# Patient Record
Sex: FEMALE | Race: WHITE | NOT HISPANIC OR LATINO | Employment: FULL TIME | URBAN - METROPOLITAN AREA
[De-identification: names, ages, dates, MRNs, and addresses within clinical notes are randomized per-mention and may not be internally consistent; named-entity substitution may affect disease eponyms.]

---

## 2018-03-12 ENCOUNTER — OFFICE VISIT - HEALTHEAST (OUTPATIENT)
Dept: FAMILY MEDICINE | Facility: CLINIC | Age: 38
End: 2018-03-12

## 2018-03-12 ENCOUNTER — COMMUNICATION - HEALTHEAST (OUTPATIENT)
Dept: TELEHEALTH | Facility: CLINIC | Age: 38
End: 2018-03-12

## 2018-03-12 DIAGNOSIS — Z71.84 COUNSELING ABOUT TRAVEL: ICD-10-CM

## 2018-03-12 DIAGNOSIS — Z13.220 SCREENING FOR LIPID DISORDERS: ICD-10-CM

## 2018-03-12 DIAGNOSIS — Z12.4 SCREENING FOR MALIGNANT NEOPLASM OF CERVIX: ICD-10-CM

## 2018-03-12 DIAGNOSIS — Z13.1 SCREENING FOR DIABETES MELLITUS: ICD-10-CM

## 2018-03-12 DIAGNOSIS — Z11.3 SCREEN FOR STD (SEXUALLY TRANSMITTED DISEASE): ICD-10-CM

## 2018-03-12 DIAGNOSIS — L98.9 SKIN LESIONS, GENERALIZED: ICD-10-CM

## 2018-03-12 DIAGNOSIS — Z00.00 ROUTINE HEALTH MAINTENANCE: ICD-10-CM

## 2018-03-12 LAB
ANION GAP SERPL CALCULATED.3IONS-SCNC: 13 MMOL/L (ref 5–18)
BUN SERPL-MCNC: 14 MG/DL (ref 8–22)
CALCIUM SERPL-MCNC: 9.9 MG/DL (ref 8.5–10.5)
CHLORIDE BLD-SCNC: 102 MMOL/L (ref 98–107)
CHOLEST SERPL-MCNC: 199 MG/DL
CO2 SERPL-SCNC: 25 MMOL/L (ref 22–31)
CREAT SERPL-MCNC: 0.78 MG/DL (ref 0.6–1.1)
FASTING STATUS PATIENT QL REPORTED: YES
GFR SERPL CREATININE-BSD FRML MDRD: >60 ML/MIN/1.73M2
GLUCOSE BLD-MCNC: 80 MG/DL (ref 70–125)
HDLC SERPL-MCNC: 57 MG/DL
LDLC SERPL CALC-MCNC: 127 MG/DL
POTASSIUM BLD-SCNC: 4.2 MMOL/L (ref 3.5–5)
SODIUM SERPL-SCNC: 140 MMOL/L (ref 136–145)
TRIGL SERPL-MCNC: 76 MG/DL

## 2018-03-12 ASSESSMENT — MIFFLIN-ST. JEOR: SCORE: 1376.58

## 2018-03-13 LAB
C TRACH DNA SPEC QL PROBE+SIG AMP: NEGATIVE
HPV SOURCE: NORMAL
HUMAN PAPILLOMA VIRUS 16 DNA: NEGATIVE
HUMAN PAPILLOMA VIRUS 18 DNA: NEGATIVE
HUMAN PAPILLOMA VIRUS FINAL DIAGNOSIS: NORMAL
HUMAN PAPILLOMA VIRUS OTHER HR: NEGATIVE
N GONORRHOEA DNA SPEC QL NAA+PROBE: NEGATIVE
SPECIMEN DESCRIPTION: NORMAL

## 2018-04-19 ENCOUNTER — RECORDS - HEALTHEAST (OUTPATIENT)
Dept: ADMINISTRATIVE | Facility: OTHER | Age: 38
End: 2018-04-19

## 2021-05-31 VITALS — WEIGHT: 149 LBS | BODY MASS INDEX: 23.95 KG/M2 | HEIGHT: 66 IN

## 2021-06-16 NOTE — PROGRESS NOTES
Assessment/Plan:     1. Routine health maintenance    2. Screening for diabetes mellitus  - Basic Metabolic Panel    3. Screening for lipid disorders  - Lipid [facility] FASTING    4. Screening for malignant neoplasm of cervix  - Gynecologic Cytology (PAP Smear)    5. Screen for STD (sexually transmitted disease)  - Chlamydia trachomatis & Neisseria gonorrhoeae, Amplified Detection    6. Counseling about travel  Yellow fever not offered in our clinic.  Referral to travel clinic.  - Ambulatory referral to Travel Clinic    7. Skin lesions, generalized  - Ambulatory referral to Dermatology    Subjective:   Lou Castellanos  is a 37 y.o. female who presents for an annual exam.  Patient has not had routine medical care in many years.  She [place], and traveled frequently with her family.  She has lived in [place] for the past 8 years, and works as a  at [employer].  Patient has never had a Pap exam.  Pertinent family history for skin cancer, and would like a formal check.  Patient is wearing sunscreen.      Patient will be traveling to [facility] in May.  She is requesting a yellow fever vaccination today.      Healthy Habits:     Regular Exercise: Tries  Sunscreen Use: yes  Healthy Diet: yes  Dental Visits Regularly: every 6 months  Seat Belt: 100%  Sexually active: no  Self Breast Exam Monthly: none  Hemoccults: no  Flex Sig: no  Colonoscopy: no  Lipid Profile: yes, been at least 7-8 years  Glucose Screen: been 7-8 years  Prevention of Osteoporosis: none  Last Dexa: none    Immunization History   Administered Date(s) Administered     DTaP, historic 1982, 02/01/1983, 04/01/1983, 03/17/1984, 07/21/1988     Hep A, historic 10/01/2013     Hep B, historic 05/18/1997, 07/07/1998, 07/29/1998     HiB, historic,unspecified 05/06/1986     IPV 1982, 02/01/1983, 04/01/1983, 03/17/1984, 07/21/1988     MMR 02/24/1984, 05/18/1995     Td,adult,historic,unspecified 10/01/2002     Tdap 10/10/1993,  "10/01/2013       Immunization status: up to date and documented.    Gynecologic History  LMP: 03/09/2018  Contraception: none  Last Pap: NEVER Results were: N/A  Last mammogram: none Results were: na    OB History     No data available          No current outpatient prescriptions on file.     No current facility-administered medications for this visit.        No past medical history on file.  Past Surgical History:   Procedure Laterality Date     WISDOM TOOTH EXTRACTION        No Known Allergies  No family history on file.  Social History     Social History     Marital status: Single     Spouse name: N/A     Number of children: N/A     Years of education: N/A     Occupational History      3m     [employer]ificial [employer]     Social History Main Topics     Smoking status: Never Smoker     Smokeless tobacco: Never Used     Alcohol use 1.8 oz/week     3 Glasses of wine per week     Drug use: No     Sexual activity: No     Other Topics Concern     Not on file     Social History Narrative     No narrative on file        Review of Systems  Fourteen point review of systems negative except as mentioned in HPI    Objective:      Vitals:    03/12/18 0834   BP: 100/62   Patient Site: Left Arm   Patient Position: Sitting   Cuff Size: Adult Regular   Pulse: 72   Temp: 98.5  F (36.9  C)   TempSrc: Oral   Weight: 149 lb (67.6 kg)   Height: 5' 6.25\" (1.683 m)     Body mass index is 23.87 kg/(m^2).    Physical Exam:  General Appearance: Alert, cooperative, no distress, appears stated age  Head: Normocephalic, without obvious abnormality, atraumatic  Eyes: PERRL, conjunctiva/corneas clear, EOM's intact. Wearing glasses.  Ears: Normal TM's and external ear canals, both ears  Nose: Nares normal, septum midline,mucosa normal, no drainage  Throat: Lips, mucosa, and tongue normal; teeth and gums normal  Neck: Supple, symmetrical, trachea midline, no adenopathy;  thyroid: not enlarged, symmetric, no tenderness/mass/nodules; no " carotid bruit or JVD  Back: Symmetric, no curvature, ROM normal, no CVA tenderness  Lungs: Clear to auscultation bilaterally, respirations unlabored  Breasts: No breast masses, tenderness, asymmetry, or nipple discharge.  Heart: Regular rate and rhythm, S1 and S2 normal, no murmur, rub, or gallop  Abdomen: Soft, non-tender, bowel sounds active all four quadrants,  no masses, no organomegaly  Pelvic:Normally developed genitalia with no external lesions or eruptions. Vagina and cervix show no lesions, inflammation, discharge or tenderness. No cystocele, No rectocele. No adnexal mass or tenderness.  Extremities: Extremities normal, atraumatic, no cyanosis or edema  Skin: Multiple normal-appearing freckles and skin lesions.   Lymph nodes: Cervical, supraclavicular, and axillary nodes normal  Neurologic: Normal   Psychologic: appropriate affective, answers all of my questions appropriately. No hallucinations, delusion, or suicidal ideations.    KETTY StewartC

## 2021-06-27 ENCOUNTER — HEALTH MAINTENANCE LETTER (OUTPATIENT)
Age: 41
End: 2021-06-27

## 2021-10-17 ENCOUNTER — HEALTH MAINTENANCE LETTER (OUTPATIENT)
Age: 41
End: 2021-10-17

## 2022-07-23 ENCOUNTER — HEALTH MAINTENANCE LETTER (OUTPATIENT)
Age: 42
End: 2022-07-23

## 2022-10-01 ENCOUNTER — HEALTH MAINTENANCE LETTER (OUTPATIENT)
Age: 42
End: 2022-10-01

## 2023-04-14 ENCOUNTER — APPOINTMENT (OUTPATIENT)
Dept: ULTRASOUND IMAGING | Facility: CLINIC | Age: 43
End: 2023-04-14
Attending: EMERGENCY MEDICINE
Payer: COMMERCIAL

## 2023-04-14 ENCOUNTER — APPOINTMENT (OUTPATIENT)
Dept: CT IMAGING | Facility: CLINIC | Age: 43
End: 2023-04-14
Attending: EMERGENCY MEDICINE
Payer: COMMERCIAL

## 2023-04-14 ENCOUNTER — HOSPITAL ENCOUNTER (EMERGENCY)
Facility: CLINIC | Age: 43
Discharge: HOME | End: 2023-04-14
Attending: EMERGENCY MEDICINE | Admitting: EMERGENCY MEDICINE
Payer: COMMERCIAL

## 2023-04-14 VITALS
TEMPERATURE: 98.1 F | OXYGEN SATURATION: 99 % | HEART RATE: 94 BPM | HEIGHT: 66 IN | SYSTOLIC BLOOD PRESSURE: 155 MMHG | RESPIRATION RATE: 16 BRPM | WEIGHT: 140 LBS | DIASTOLIC BLOOD PRESSURE: 85 MMHG | BODY MASS INDEX: 22.5 KG/M2

## 2023-04-14 DIAGNOSIS — R10.32 ABDOMINAL PAIN, LEFT LOWER QUADRANT: ICD-10-CM

## 2023-04-14 DIAGNOSIS — R11.0 NAUSEA: ICD-10-CM

## 2023-04-14 LAB
ALBUMIN SERPL-MCNC: 4.2 G/DL (ref 3.5–5)
ALBUMIN UR-MCNC: NEGATIVE MG/DL
ALP SERPL-CCNC: 39 U/L (ref 45–120)
ALT SERPL W P-5'-P-CCNC: 11 U/L (ref 0–45)
ANION GAP SERPL CALCULATED.3IONS-SCNC: 12 MMOL/L (ref 5–18)
APPEARANCE UR: CLEAR
AST SERPL W P-5'-P-CCNC: 19 U/L (ref 0–40)
BACTERIA #/AREA URNS HPF: ABNORMAL /HPF
BASOPHILS # BLD AUTO: 0.1 10E3/UL (ref 0–0.2)
BASOPHILS NFR BLD AUTO: 1 %
BILIRUB SERPL-MCNC: 0.7 MG/DL (ref 0–1)
BILIRUB UR QL STRIP: NEGATIVE
BUN SERPL-MCNC: 10 MG/DL (ref 8–22)
CALCIUM SERPL-MCNC: 9.3 MG/DL (ref 8.5–10.5)
CHLORIDE BLD-SCNC: 102 MMOL/L (ref 98–107)
CO2 SERPL-SCNC: 23 MMOL/L (ref 22–31)
COLOR UR AUTO: COLORLESS
CREAT SERPL-MCNC: 0.84 MG/DL (ref 0.6–1.1)
EOSINOPHIL # BLD AUTO: 0 10E3/UL (ref 0–0.7)
EOSINOPHIL NFR BLD AUTO: 0 %
ERYTHROCYTE [DISTWIDTH] IN BLOOD BY AUTOMATED COUNT: 13.3 % (ref 10–15)
GFR SERPL CREATININE-BSD FRML MDRD: 90 ML/MIN/1.73M2
GLUCOSE BLD-MCNC: 107 MG/DL (ref 70–125)
GLUCOSE UR STRIP-MCNC: NEGATIVE MG/DL
HCG UR QL: NEGATIVE
HCT VFR BLD AUTO: 38.9 % (ref 35–47)
HGB BLD-MCNC: 12.5 G/DL (ref 11.7–15.7)
HGB UR QL STRIP: NEGATIVE
IMM GRANULOCYTES # BLD: 0 10E3/UL
IMM GRANULOCYTES NFR BLD: 0 %
KETONES UR STRIP-MCNC: NEGATIVE MG/DL
LEUKOCYTE ESTERASE UR QL STRIP: NEGATIVE
LIPASE SERPL-CCNC: 22 U/L (ref 0–52)
LYMPHOCYTES # BLD AUTO: 1.3 10E3/UL (ref 0.8–5.3)
LYMPHOCYTES NFR BLD AUTO: 12 %
MAGNESIUM SERPL-MCNC: 1.8 MG/DL (ref 1.8–2.6)
MCH RBC QN AUTO: 28.5 PG (ref 26.5–33)
MCHC RBC AUTO-ENTMCNC: 32.1 G/DL (ref 31.5–36.5)
MCV RBC AUTO: 89 FL (ref 78–100)
MONOCYTES # BLD AUTO: 0.6 10E3/UL (ref 0–1.3)
MONOCYTES NFR BLD AUTO: 6 %
NEUTROPHILS # BLD AUTO: 8.9 10E3/UL (ref 1.6–8.3)
NEUTROPHILS NFR BLD AUTO: 81 %
NITRATE UR QL: NEGATIVE
NRBC # BLD AUTO: 0 10E3/UL
NRBC BLD AUTO-RTO: 0 /100
PH UR STRIP: 5.5 [PH] (ref 5–7)
PLATELET # BLD AUTO: 320 10E3/UL (ref 150–450)
POTASSIUM BLD-SCNC: 3.9 MMOL/L (ref 3.5–5)
PROT SERPL-MCNC: 7.8 G/DL (ref 6–8)
RBC # BLD AUTO: 4.38 10E6/UL (ref 3.8–5.2)
RBC URINE: <1 /HPF
SODIUM SERPL-SCNC: 137 MMOL/L (ref 136–145)
SP GR UR STRIP: 1 (ref 1–1.03)
SQUAMOUS EPITHELIAL: 1 /HPF
UROBILINOGEN UR STRIP-MCNC: <2 MG/DL
WBC # BLD AUTO: 11 10E3/UL (ref 4–11)
WBC URINE: <1 /HPF

## 2023-04-14 PROCEDURE — 96375 TX/PRO/DX INJ NEW DRUG ADDON: CPT

## 2023-04-14 PROCEDURE — 80053 COMPREHEN METABOLIC PANEL: CPT | Performed by: EMERGENCY MEDICINE

## 2023-04-14 PROCEDURE — 81025 URINE PREGNANCY TEST: CPT | Performed by: EMERGENCY MEDICINE

## 2023-04-14 PROCEDURE — 36415 COLL VENOUS BLD VENIPUNCTURE: CPT | Performed by: EMERGENCY MEDICINE

## 2023-04-14 PROCEDURE — 83690 ASSAY OF LIPASE: CPT | Performed by: EMERGENCY MEDICINE

## 2023-04-14 PROCEDURE — 81003 URINALYSIS AUTO W/O SCOPE: CPT | Performed by: EMERGENCY MEDICINE

## 2023-04-14 PROCEDURE — 83735 ASSAY OF MAGNESIUM: CPT | Performed by: EMERGENCY MEDICINE

## 2023-04-14 PROCEDURE — 96374 THER/PROPH/DIAG INJ IV PUSH: CPT | Mod: 59

## 2023-04-14 PROCEDURE — 250N000011 HC RX IP 250 OP 636: Performed by: EMERGENCY MEDICINE

## 2023-04-14 PROCEDURE — 99285 EMERGENCY DEPT VISIT HI MDM: CPT | Mod: 25

## 2023-04-14 PROCEDURE — 74177 CT ABD & PELVIS W/CONTRAST: CPT

## 2023-04-14 PROCEDURE — 76856 US EXAM PELVIC COMPLETE: CPT

## 2023-04-14 PROCEDURE — 85025 COMPLETE CBC W/AUTO DIFF WBC: CPT | Performed by: EMERGENCY MEDICINE

## 2023-04-14 RX ORDER — ONDANSETRON 2 MG/ML
4 INJECTION INTRAMUSCULAR; INTRAVENOUS ONCE
Status: COMPLETED | OUTPATIENT
Start: 2023-04-14 | End: 2023-04-14

## 2023-04-14 RX ORDER — IOPAMIDOL 755 MG/ML
100 INJECTION, SOLUTION INTRAVASCULAR ONCE
Status: COMPLETED | OUTPATIENT
Start: 2023-04-14 | End: 2023-04-14

## 2023-04-14 RX ORDER — ONDANSETRON 4 MG/1
4 TABLET, ORALLY DISINTEGRATING ORAL EVERY 8 HOURS PRN
Qty: 10 TABLET | Refills: 0 | Status: SHIPPED | OUTPATIENT
Start: 2023-04-14 | End: 2023-04-17

## 2023-04-14 RX ORDER — KETOROLAC TROMETHAMINE 15 MG/ML
15 INJECTION, SOLUTION INTRAMUSCULAR; INTRAVENOUS ONCE
Status: COMPLETED | OUTPATIENT
Start: 2023-04-14 | End: 2023-04-14

## 2023-04-14 RX ADMIN — IOPAMIDOL 100 ML: 755 INJECTION, SOLUTION INTRAVENOUS at 07:09

## 2023-04-14 RX ADMIN — KETOROLAC TROMETHAMINE 15 MG: 15 INJECTION, SOLUTION INTRAMUSCULAR; INTRAVENOUS at 05:31

## 2023-04-14 RX ADMIN — ONDANSETRON 4 MG: 2 INJECTION INTRAMUSCULAR; INTRAVENOUS at 05:31

## 2023-04-14 ASSESSMENT — ENCOUNTER SYMPTOMS
CONSTIPATION: 0
ABDOMINAL PAIN: 1
CHILLS: 1
DIARRHEA: 0
FEVER: 1
VOMITING: 1
NAUSEA: 1

## 2023-04-14 ASSESSMENT — ACTIVITIES OF DAILY LIVING (ADL)
ADLS_ACUITY_SCORE: 33
ADLS_ACUITY_SCORE: 35

## 2023-04-14 NOTE — ED PROVIDER NOTES
EMERGENCY DEPARTMENT ENCOUNTER      NAME: Lou Castellanos  AGE: 42 year old female  YOB: 1980  MRN: [id number]  EVALUATION DATE & TIME: 4/14/2023  4:55 AM    PCP: Mallory Anderson    ED PROVIDER: Braulio Davila MD      Chief Complaint   Patient presents with     Abdominal Pain     FINAL IMPRESSION:  1. Abdominal pain, left lower quadrant        ED CoURSE & MEDICAL DECISION MAKING:    Pertinent Labs & Imaging studies reviewed. (See chart for details)  42 year old female presents to the Emergency Department for evaluation of abdominal discomfort.  Patient reporting a 2-day history of increasing left lower quadrant abdominal discomfort.  Described as an aching sensation.  Some associated nausea and vomiting.  Denies other symptoms.  Otherwise healthy.  On examination patient noted to have normal vital signs.  She had focal left lower quadrant pain to palpation.  Differential broad at this point including but not limited to ovarian cyst, ovarian pathology, kidney stone, kidney infection, bladder infection, diverticulitis.  I recommend initiation of care with laboratory testing and ultrasound for further assessment of discomfort.    Pelvic ultrasound was negative.  We will plan for CT scan imaging for further assessment.  Patient signed out at this point in care.  Anticipate discharge CT unremarkable.       5:01 AM I met with the patient to gather history and perform my exam. ED course and treatment discussed. Patient seen in the hallway due to critical capacity and boarding crisis leaving no ED rooms available.     At the conclusion of the encounter I discussed the results of all of the tests and the disposition. The questions were answered. The patient or family acknowledged understanding and was agreeable with the care plan.       Medical Decision Making    History:    Supplemental history from: Documented in chart, if applicable    External Record(s) reviewed: Documented in chart, if  "applicable.    Work Up:    Chart documentation includes differential considered and any EKGs or imaging independently interpreted by provider, where specified.    In additional to work up documented, I considered the following work up: Documented in chart, if applicable.    External consultation:    Discussion of management with another provider: Documented in chart, if applicable    Complicating factors:    Care impacted by chronic illness: N/A    Care affected by social determinants of health: N/A    Disposition considerations: Admission considered. Patient was signed out to the oncoming physician, disposition pending.        MEDICATIONS GIVEN IN THE EMERGENCY:  Medications   ketorolac (TORADOL) injection 15 mg (15 mg Intravenous $Given 4/14/23 0531)   ondansetron (ZOFRAN) injection 4 mg (4 mg Intravenous $Given 4/14/23 0531)       NEW PRESCRIPTIONS STARTED AT TODAY'S ER VISIT  New Prescriptions    No medications on file          =================================================================    HPI    Patient information was obtained from: Patient     Use of : N/A       Lou Castellanos is a 42 year old female with no contributory medical history who presents to this ED via walk-in for evaluation of abdominal pain     The patient reports they have had left lower quadrant abdominal pain for a \"couple days\" that \"comes and goes\" and initially started as \"dull\". The pain has slowly gotten worse and now it is \"more severe\" this morning. The patient notes the pain improves following a bowel movement and does not worsen with palpation. The patient also complains of nausea and vomiting with this pain. The patient felt feverish, chills, and body aches two days ago, but those symptoms have resolved. The patient denies constipation, diarrhea, urinary symptoms, or chance of pregnancy.     The patient denies a history of kidney stones or ovarian cysts.      REVIEW OF SYSTEMS   Review of Systems "   Constitutional: Positive for chills (resolved) and fever (resolved).   Gastrointestinal: Positive for abdominal pain (LLQ), nausea and vomiting. Negative for constipation and diarrhea.   Genitourinary: Negative.        PAST MEDICAL HISTORY:  History reviewed. No pertinent past medical history.    PAST SURGICAL HISTORY:  Past Surgical History:   Procedure Laterality Date     WISDOM TOOTH EXTRACTION             CURRENT MEDICATIONS:    No current outpatient medications on file.      ALLERGIES:  No Known Allergies    FAMILY HISTORY:  Family History   Problem Relation Age of Onset     Melanoma Mother      No Known Problems Sister      Dementia Maternal Grandmother      Coronary Artery Disease Maternal Grandfather      Hypotension Paternal Grandmother      Coronary Artery Disease Paternal Grandfather      Anxiety Disorder Paternal Grandfather        SOCIAL HISTORY:   Social History     Socioeconomic History     Marital status: Single   Tobacco Use     Smoking status: Never     Smokeless tobacco: Never   Substance and Sexual Activity     Alcohol use: Yes     Alcohol/week: 3.0 standard drinks of alcohol     Drug use: No     Sexual activity: Never     PHYSICAL EXAM    Constitutional: Well developed, Well nourished, NAD  HENT: Normocephalic, Atraumatic, Bilateral external ears normal, Oropharynx normal, mucous membranes moist, Nose normal. Neck-  Normal range of motion, No tenderness, Supple, No stridor.   Eyes: PERRL, EOMI, Conjunctiva normal, No discharge.   Respiratory: Normal breath sounds, No respiratory distress, No wheezing, Speaks full sentences easily. No cough.   Cardiovascular: Normal heart rate, Regular rhythm, No murmurs Chest wall nontender.    GI: Left lower quadrant abdominal pain to palpation no guarding or peritoneal signs no appreciable distention or palpable masses  : No cva tenderness    Musculoskeletal: 2+ DP pulses. No edema. No cyanosis. Good range of motion in all major joints. No tenderness to  palpation. No tenderness of the CTLS spine.   Integument: Warm, Dry, No erythema, No rash. No petechiae.   Neurologic: Alert & oriented x 3, Normal motor function, Normal sensory function, No focal deficits noted.   Psychiatric: Affect normal, Judgment normal, Mood normal. Cooperative.     LAB:  All pertinent labs reviewed and interpreted.  Results for orders placed or performed during the hospital encounter of 04/14/23   US Pelvic Complete with Transvaginal    Impression    IMPRESSION:   1.  3 cm intramural uterine fibroid.  2.  Otherwise, unremarkable appearance of the uterus and ovaries.  3.  No free fluid in the pelvis.    Comprehensive metabolic panel   Result Value Ref Range    Sodium 137 136 - 145 mmol/L    Potassium 3.9 3.5 - 5.0 mmol/L    Chloride 102 98 - 107 mmol/L    Carbon Dioxide (CO2) 23 22 - 31 mmol/L    Anion Gap 12 5 - 18 mmol/L    Urea Nitrogen 10 8 - 22 mg/dL    Creatinine 0.84 0.60 - 1.10 mg/dL    Calcium 9.3 8.5 - 10.5 mg/dL    Glucose 107 70 - 125 mg/dL    Alkaline Phosphatase 39 (L) 45 - 120 U/L    AST 19 0 - 40 U/L    ALT 11 0 - 45 U/L    Protein Total 7.8 6.0 - 8.0 g/dL    Albumin 4.2 3.5 - 5.0 g/dL    Bilirubin Total 0.7 0.0 - 1.0 mg/dL    GFR Estimate 90 >60 mL/min/1.73m2   Result Value Ref Range    Lipase 22 0 - 52 U/L   Result Value Ref Range    Magnesium 1.8 1.8 - 2.6 mg/dL   UA with Microscopic reflex to Culture    Specimen: Urine, Midstream   Result Value Ref Range    Color Urine Colorless Colorless, Straw, Light Yellow, Yellow    Appearance Urine Clear Clear    Glucose Urine Negative Negative mg/dL    Bilirubin Urine Negative Negative    Ketones Urine Negative Negative mg/dL    Specific Gravity Urine 1.004 1.001 - 1.030    Blood Urine Negative Negative    pH Urine 5.5 5.0 - 7.0    Protein Albumin Urine Negative Negative mg/dL    Urobilinogen Urine <2.0 <2.0 mg/dL    Nitrite Urine Negative Negative    Leukocyte Esterase Urine Negative Negative    Bacteria Urine Few (A) None Seen /HPF     RBC Urine <1 <=2 /HPF    WBC Urine <1 <=5 /HPF    Squamous Epithelials Urine 1 <=1 /HPF   HCG qualitative urine   Result Value Ref Range    hCG Urine Qualitative Negative Negative   CBC with platelets and differential   Result Value Ref Range    WBC Count 11.0 4.0 - 11.0 10e3/uL    RBC Count 4.38 3.80 - 5.20 10e6/uL    Hemoglobin 12.5 11.7 - 15.7 g/dL    Hematocrit 38.9 35.0 - 47.0 %    MCV 89 78 - 100 fL    MCH 28.5 26.5 - 33.0 pg    MCHC 32.1 31.5 - 36.5 g/dL    RDW 13.3 10.0 - 15.0 %    Platelet Count 320 150 - 450 10e3/uL    % Neutrophils 81 %    % Lymphocytes 12 %    % Monocytes 6 %    % Eosinophils 0 %    % Basophils 1 %    % Immature Granulocytes 0 %    NRBCs per 100 WBC 0 <1 /100    Absolute Neutrophils 8.9 (H) 1.6 - 8.3 10e3/uL    Absolute Lymphocytes 1.3 0.8 - 5.3 10e3/uL    Absolute Monocytes 0.6 0.0 - 1.3 10e3/uL    Absolute Eosinophils 0.0 0.0 - 0.7 10e3/uL    Absolute Basophils 0.1 0.0 - 0.2 10e3/uL    Absolute Immature Granulocytes 0.0 <=0.4 10e3/uL    Absolute NRBCs 0.0 10e3/uL       RADIOLOGY:  Reviewed all pertinent imaging. Please see official radiology report.  US Pelvic Complete with Transvaginal   Preliminary Result   IMPRESSION:    1.  3 cm intramural uterine fibroid.   2.  Otherwise, unremarkable appearance of the uterus and ovaries.   3.  No free fluid in the pelvis.       CT Abdomen Pelvis w Contrast    (Results Pending)       I, Swetha Bolanos, am serving as a scribe to document services personally performed by Dr. Davila based on my observation and the provider's statements to me. I, Dr. Braulio Davila, attest that Swetha Bolanos is acting in a scribe capacity, has observed my performance of the services and has documented them in accordance with my direction.    Braulio Davila MD  [facility] EMERGENCY ROOM  [address] [id number] [phone]     Braulio Davila MD  04/14/23 0657

## 2023-04-14 NOTE — ED PROVIDER NOTES
EMERGENCY DEPARTMENT SIGN OUT NOTE        ED COURSE AND MEDICAL DECISION MAKING  42-year-old female who presented to the ED evaluation of left lower quad abdominal pain was checked out to me by Dr. Davila.  At the time of checkout the abdominal CT scan was still pending.    Previous laboratory tests were reviewed.  CBC, BMP, hepatic panel, lipase, and UA were all reassuring.  hCG testing was negative.  Pelvic ultrasound revealed a uterine fibroid but was otherwise unremarkable.    CT scan of the abdomen does not show any abnormalities to explain the patient's symptoms.    Patient was reevaluated and informed of the lab and imaging results.  The patient was informed that her left lower quad abdominal pain may be related to spasms/cramps.  She stated that her symptoms improved after receiving the IV Toradol.  Patient also stated that she has experienced similar symptoms in the past which typically occur first thing in the morning and are associated with nausea.  The patient notes that the symptoms seem to resolve throughout the day.  She was then informed that her symptoms may represent GERD even though the location of the pain left lower quadrant does not appear consistent.   After educating and reassure the patient she felt comfortable returning home.  The patient was sent home with Zofran and omeprazole.  The patient was instructed to follow-up with her primary care provider for reevaluation or to return back to ED sooner for any worsening abdominal pain, nausea or vomiting, or any other new or concerning symptoms.    Patient was signed out to me by Dr Braulio Davila at 7:00 AM.  7:31 AM I rechecked and updated the patient with results. We discussed plans for discharge including supportive cares, symptomatic treatment, outpatient follow up, and reasons to return to the emergency department.    In brief, Lou Castellanos is a 42 year old female who initially presented with ongoing left quadrant pain for the  past 3 days.     At time of sign out, abdominal CT scan was pending.     FINAL IMPRESSION    1. Abdominal pain, left lower quadrant    2. Nausea        ED MEDS  Medications   ketorolac (TORADOL) injection 15 mg (15 mg Intravenous $Given 4/14/23 0531)   ondansetron (ZOFRAN) injection 4 mg (4 mg Intravenous $Given 4/14/23 0531)   iopamidol (ISOVUE-370) solution 100 mL (100 mLs Intravenous $Given 4/14/23 6007)       LAB  Labs Ordered and Resulted from Time of ED Arrival to Time of ED Departure   COMPREHENSIVE METABOLIC PANEL - Abnormal       Result Value    Sodium 137      Potassium 3.9      Chloride 102      Carbon Dioxide (CO2) 23      Anion Gap 12      Urea Nitrogen 10      Creatinine 0.84      Calcium 9.3      Glucose 107      Alkaline Phosphatase 39 (*)     AST 19      ALT 11      Protein Total 7.8      Albumin 4.2      Bilirubin Total 0.7      GFR Estimate 90     ROUTINE UA WITH MICROSCOPIC REFLEX TO CULTURE - Abnormal    Color Urine Colorless      Appearance Urine Clear      Glucose Urine Negative      Bilirubin Urine Negative      Ketones Urine Negative      Specific Gravity Urine 1.004      Blood Urine Negative      pH Urine 5.5      Protein Albumin Urine Negative      Urobilinogen Urine <2.0      Nitrite Urine Negative      Leukocyte Esterase Urine Negative      Bacteria Urine Few (*)     RBC Urine <1      WBC Urine <1      Squamous Epithelials Urine 1     CBC WITH PLATELETS AND DIFFERENTIAL - Abnormal    WBC Count 11.0      RBC Count 4.38      Hemoglobin 12.5      Hematocrit 38.9      MCV 89      MCH 28.5      MCHC 32.1      RDW 13.3      Platelet Count 320      % Neutrophils 81      % Lymphocytes 12      % Monocytes 6      % Eosinophils 0      % Basophils 1      % Immature Granulocytes 0      NRBCs per 100 WBC 0      Absolute Neutrophils 8.9 (*)     Absolute Lymphocytes 1.3      Absolute Monocytes 0.6      Absolute Eosinophils 0.0      Absolute Basophils 0.1      Absolute Immature Granulocytes 0.0       Absolute NRBCs 0.0     LIPASE - Normal    Lipase 22     MAGNESIUM - Normal    Magnesium 1.8     HCG QUALITATIVE URINE - Normal    hCG Urine Qualitative Negative         RADIOLOGY    CT Abdomen Pelvis w Contrast   Final Result   IMPRESSION:    1.  No abnormalities are seen to explain symptoms. Specifically, no inflammatory changes, calculi or signs of obstruction.   2.  Right-sided myometrial uterine fibroid again noted.   3.  Evidence of previous granulomatous disease.   4.  Pectus excavatum deformity.      US Pelvic Complete with Transvaginal   Final Result   IMPRESSION:    1.  3 cm intramural uterine fibroid.   2.  Otherwise, unremarkable appearance of the uterus and ovaries.   3.  No free fluid in the pelvis.           DISCHARGE MEDS  New Prescriptions    OMEPRAZOLE (PRILOSEC) 20 MG DR CAPSULE    Take 1 capsule (20 mg) by mouth daily for 14 days    ONDANSETRON (ZOFRAN ODT) 4 MG ODT TAB    Take 1 tablet (4 mg) by mouth every 8 hours as needed         Hedy Ronquillo DO  Emergency Medicine  [facility] EMERGENCY ROOM  [address] [place] [id number] [phone]       Hedy Ronquillo DO  04/14/23 2243

## 2023-04-14 NOTE — ED TRIAGE NOTES
Pt reports a 3 day hx of abddomeinal pain that the pt indicates as LLQ pain. No hx of Crohn's or diverticulitis  Or Ulcerative colitis. Endorses normal BMs and denies any blood in her stool. Denies any difficulty urinating     Triage Assessment     Row Name 04/14/23 0446       Triage Assessment (Adult)    Airway WDL WDL       Respiratory WDL    Respiratory WDL WDL       Skin Circulation/Temperature WDL    Skin Circulation/Temperature WDL WDL       Cardiac WDL    Cardiac WDL WDL       Peripheral/Neurovascular WDL    Peripheral Neurovascular WDL WDL       Cognitive/Neuro/Behavioral WDL    Cognitive/Neuro/Behavioral WDL WDL

## 2023-04-14 NOTE — DISCHARGE INSTRUCTIONS
Abdominal CT scan, pelvic ultrasound, and laboratory test all appear reassuring here today in the ED.   The underlying cause of your symptoms remains unclear.  Either gastroesophageal reflux disease or spasms/cramps are suspected at this time.    Take the prescribed omeprazole daily as directed for the next 2 weeks.  Use these prescribed Zofran as needed for any further episodes of nausea or vomiting.      Follow-up with your primary care provider for reevaluation return back to ED sooner for any worsening abdominal pain, vomiting, or any other new or concerning symptoms.

## 2023-08-12 ENCOUNTER — HEALTH MAINTENANCE LETTER (OUTPATIENT)
Age: 43
End: 2023-08-12

## 2024-03-09 ENCOUNTER — HEALTH MAINTENANCE LETTER (OUTPATIENT)
Age: 44
End: 2024-03-09

## 2024-09-29 ENCOUNTER — HEALTH MAINTENANCE LETTER (OUTPATIENT)
Age: 44
End: 2024-09-29